# Patient Record
Sex: MALE | Race: WHITE | Employment: OTHER | URBAN - METROPOLITAN AREA
[De-identification: names, ages, dates, MRNs, and addresses within clinical notes are randomized per-mention and may not be internally consistent; named-entity substitution may affect disease eponyms.]

---

## 2018-11-02 ENCOUNTER — OFFICE VISIT (OUTPATIENT)
Dept: FAMILY MEDICINE CLINIC | Age: 38
End: 2018-11-02
Payer: MEDICAID

## 2018-11-02 VITALS
SYSTOLIC BLOOD PRESSURE: 128 MMHG | HEIGHT: 70 IN | WEIGHT: 141 LBS | TEMPERATURE: 98.5 F | RESPIRATION RATE: 14 BRPM | HEART RATE: 90 BPM | DIASTOLIC BLOOD PRESSURE: 80 MMHG | BODY MASS INDEX: 20.19 KG/M2 | OXYGEN SATURATION: 99 %

## 2018-11-02 DIAGNOSIS — F31.9 BIPOLAR DISEASE, CHRONIC (HCC): ICD-10-CM

## 2018-11-02 DIAGNOSIS — F20.9 SCHIZOPHRENIA, CHRONIC CONDITION (HCC): Primary | ICD-10-CM

## 2018-11-02 DIAGNOSIS — Z23 NEEDS FLU SHOT: ICD-10-CM

## 2018-11-02 PROCEDURE — 99203 OFFICE O/P NEW LOW 30 MIN: CPT | Performed by: STUDENT IN AN ORGANIZED HEALTH CARE EDUCATION/TRAINING PROGRAM

## 2018-11-02 PROCEDURE — G8428 CUR MEDS NOT DOCUMENT: HCPCS | Performed by: STUDENT IN AN ORGANIZED HEALTH CARE EDUCATION/TRAINING PROGRAM

## 2018-11-02 PROCEDURE — G8482 FLU IMMUNIZE ORDER/ADMIN: HCPCS | Performed by: STUDENT IN AN ORGANIZED HEALTH CARE EDUCATION/TRAINING PROGRAM

## 2018-11-02 PROCEDURE — 4004F PT TOBACCO SCREEN RCVD TLK: CPT | Performed by: STUDENT IN AN ORGANIZED HEALTH CARE EDUCATION/TRAINING PROGRAM

## 2018-11-02 PROCEDURE — G8420 CALC BMI NORM PARAMETERS: HCPCS | Performed by: STUDENT IN AN ORGANIZED HEALTH CARE EDUCATION/TRAINING PROGRAM

## 2018-11-02 PROCEDURE — 6360000002 HC RX W HCPCS

## 2018-11-02 PROCEDURE — 90686 IIV4 VACC NO PRSV 0.5 ML IM: CPT

## 2018-11-02 PROCEDURE — 99212 OFFICE O/P EST SF 10 MIN: CPT | Performed by: STUDENT IN AN ORGANIZED HEALTH CARE EDUCATION/TRAINING PROGRAM

## 2018-11-02 PROCEDURE — G0008 ADMIN INFLUENZA VIRUS VAC: HCPCS

## 2018-11-02 RX ORDER — TRAZODONE HYDROCHLORIDE 150 MG/1
1 TABLET ORAL NIGHTLY
Refills: 2 | COMMUNITY
Start: 2018-10-02

## 2018-11-02 RX ORDER — RISPERIDONE 2 MG/1
1 TABLET, FILM COATED ORAL EVERY MORNING
Refills: 2 | COMMUNITY
Start: 2018-10-02

## 2018-11-02 RX ORDER — CYPROHEPTADINE HYDROCHLORIDE 4 MG/1
2 TABLET ORAL NIGHTLY
Refills: 2 | COMMUNITY
Start: 2018-10-02

## 2018-11-02 RX ORDER — RISPERIDONE 4 MG/1
1 TABLET, FILM COATED ORAL NIGHTLY
Refills: 2 | COMMUNITY
Start: 2018-10-02

## 2018-11-02 ASSESSMENT — PATIENT HEALTH QUESTIONNAIRE - PHQ9
SUM OF ALL RESPONSES TO PHQ QUESTIONS 1-9: 0
SUM OF ALL RESPONSES TO PHQ9 QUESTIONS 1 & 2: 0
1. LITTLE INTEREST OR PLEASURE IN DOING THINGS: 0
2. FEELING DOWN, DEPRESSED OR HOPELESS: 0
SUM OF ALL RESPONSES TO PHQ QUESTIONS 1-9: 0

## 2018-11-02 ASSESSMENT — ENCOUNTER SYMPTOMS
CHEST TIGHTNESS: 0
NAUSEA: 0
SHORTNESS OF BREATH: 0
WHEEZING: 0
DIARRHEA: 0
PHOTOPHOBIA: 0
VOMITING: 0
SORE THROAT: 0
BLOOD IN STOOL: 0
CONSTIPATION: 0
ABDOMINAL PAIN: 0

## 2018-11-02 NOTE — PROGRESS NOTES
M 40   New pt   Comes for checkup   No complaints      Bipolar 1 and schizophrenia   Sees Psychiatry   Controlled   No SI or HI     Some arthritis from old fxs     Occasional headaches   ROS WNL    Blood pressure 128/80, pulse 90, temperature 98.5 °F (36.9 °C), temperature source Oral, resp. rate 14, height 5' 10\" (1.778 m), weight 141 lb (64 kg), SpO2 99 %. HEENT WNL     Heart regular    Lungs clear    abd non-tender      No edema    Pulses intact    Flat affect    Continue with Psychiatry    RTO 1 year or as necessary    Attending Physician Statement  I have discussed the case, including pertinent history and exam findings with the resident. I also have seen the patient and performed key portions of the examination. I agree with the documented assessment and plan.

## 2018-12-12 ENCOUNTER — HOSPITAL ENCOUNTER (OUTPATIENT)
Age: 38
Discharge: HOME | End: 2018-12-12
Payer: MEDICAID

## 2018-12-12 LAB
ALBUMIN SERPL-MCNC: 4.2 G/DL (ref 3.5–5.2)
ALP BLD-CCNC: 90 U/L (ref 40–129)
ALT SERPL-CCNC: 20 U/L (ref 0–40)
ANION GAP SERPL CALCULATED.3IONS-SCNC: 14 MMOL/L (ref 7–16)
AST SERPL-CCNC: 23 U/L (ref 0–39)
BASOPHILS ABSOLUTE: 0.05 E9/L (ref 0–0.2)
BASOPHILS RELATIVE PERCENT: 0.6 % (ref 0–2)
BILIRUB SERPL-MCNC: 0.4 MG/DL (ref 0–1.2)
BUN BLDV-MCNC: 19 MG/DL (ref 6–20)
CALCIUM SERPL-MCNC: 9 MG/DL (ref 8.6–10.2)
CHLORIDE BLD-SCNC: 100 MMOL/L (ref 98–107)
CHOLESTEROL, TOTAL: 225 MG/DL (ref 0–199)
CO2: 26 MMOL/L (ref 22–29)
CREAT SERPL-MCNC: 1 MG/DL (ref 0.7–1.2)
EOSINOPHILS ABSOLUTE: 0.08 E9/L (ref 0.05–0.5)
EOSINOPHILS RELATIVE PERCENT: 0.9 % (ref 0–6)
GFR AFRICAN AMERICAN: >60
GFR NON-AFRICAN AMERICAN: >60 ML/MIN/1.73
GLUCOSE BLD-MCNC: 84 MG/DL (ref 74–99)
HCT VFR BLD CALC: 41.5 % (ref 37–54)
HDLC SERPL-MCNC: 62 MG/DL
HEMOGLOBIN: 14 G/DL (ref 12.5–16.5)
IMMATURE GRANULOCYTES #: 0.03 E9/L
IMMATURE GRANULOCYTES %: 0.4 % (ref 0–5)
LDL CHOLESTEROL CALCULATED: 145 MG/DL (ref 0–99)
LYMPHOCYTES ABSOLUTE: 2.57 E9/L (ref 1.5–4)
LYMPHOCYTES RELATIVE PERCENT: 30.1 % (ref 20–42)
MCH RBC QN AUTO: 29.8 PG (ref 26–35)
MCHC RBC AUTO-ENTMCNC: 33.7 % (ref 32–34.5)
MCV RBC AUTO: 88.3 FL (ref 80–99.9)
MONOCYTES ABSOLUTE: 0.72 E9/L (ref 0.1–0.95)
MONOCYTES RELATIVE PERCENT: 8.4 % (ref 2–12)
NEUTROPHILS ABSOLUTE: 5.1 E9/L (ref 1.8–7.3)
NEUTROPHILS RELATIVE PERCENT: 59.6 % (ref 43–80)
PDW BLD-RTO: 12.7 FL (ref 11.5–15)
PLATELET # BLD: 189 E9/L (ref 130–450)
PMV BLD AUTO: 10.2 FL (ref 7–12)
POTASSIUM SERPL-SCNC: 4.1 MMOL/L (ref 3.5–5)
RBC # BLD: 4.7 E12/L (ref 3.8–5.8)
SODIUM BLD-SCNC: 140 MMOL/L (ref 132–146)
TOTAL PROTEIN: 6.8 G/DL (ref 6.4–8.3)
TRIGL SERPL-MCNC: 91 MG/DL (ref 0–149)
VLDLC SERPL CALC-MCNC: 18 MG/DL
WBC # BLD: 8.6 E9/L (ref 4.5–11.5)

## 2018-12-12 PROCEDURE — 80053 COMPREHEN METABOLIC PANEL: CPT

## 2018-12-12 PROCEDURE — 36415 COLL VENOUS BLD VENIPUNCTURE: CPT

## 2018-12-12 PROCEDURE — 85025 COMPLETE CBC W/AUTO DIFF WBC: CPT

## 2018-12-12 PROCEDURE — 80061 LIPID PANEL: CPT

## 2022-04-20 ENCOUNTER — HOSPITAL ENCOUNTER (EMERGENCY)
Age: 42
Discharge: HOME | End: 2022-04-20
Payer: MEDICAID

## 2022-04-20 ENCOUNTER — APPOINTMENT (OUTPATIENT)
Dept: GENERAL RADIOLOGY | Age: 42
End: 2022-04-20
Payer: MEDICAID

## 2022-04-20 VITALS
WEIGHT: 160 LBS | SYSTOLIC BLOOD PRESSURE: 117 MMHG | RESPIRATION RATE: 16 BRPM | TEMPERATURE: 98.3 F | DIASTOLIC BLOOD PRESSURE: 80 MMHG | OXYGEN SATURATION: 99 % | HEIGHT: 70 IN | BODY MASS INDEX: 22.9 KG/M2 | HEART RATE: 72 BPM

## 2022-04-20 DIAGNOSIS — S63.501A SPRAIN OF RIGHT WRIST, INITIAL ENCOUNTER: ICD-10-CM
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
## 2022-04-20 DIAGNOSIS — S93.402A SPRAIN OF LEFT ANKLE, UNSPECIFIED LIGAMENT, INITIAL ENCOUNTER: ICD-10-CM

## 2022-04-20 DIAGNOSIS — S93.602A FOOT SPRAIN, LEFT, INITIAL ENCOUNTER: ICD-10-CM

## 2022-04-20 PROCEDURE — 73610 X-RAY EXAM OF ANKLE: CPT

## 2022-04-20 PROCEDURE — 73110 X-RAY EXAM OF WRIST: CPT

## 2022-04-20 PROCEDURE — 6370000000 HC RX 637 (ALT 250 FOR IP): Performed by: NURSE PRACTITIONER

## 2022-04-20 PROCEDURE — 73630 X-RAY EXAM OF FOOT: CPT

## 2022-04-20 PROCEDURE — 99283 EMERGENCY DEPT VISIT LOW MDM: CPT

## 2022-04-20 RX ORDER — DIVALPROEX SODIUM 250 MG/1
250 TABLET, DELAYED RELEASE ORAL 2 TIMES DAILY
COMMUNITY

## 2022-04-20 RX ORDER — PROPRANOLOL HYDROCHLORIDE 10 MG/1
10 TABLET ORAL 2 TIMES DAILY
COMMUNITY

## 2022-04-20 RX ORDER — IBUPROFEN 600 MG/1
600 TABLET ORAL ONCE
Status: COMPLETED | OUTPATIENT
Start: 2022-04-20 | End: 2022-04-20

## 2022-04-20 RX ADMIN — IBUPROFEN 600 MG: 600 TABLET ORAL at 11:42

## 2022-04-20 ASSESSMENT — PAIN DESCRIPTION - ORIENTATION: ORIENTATION: LEFT;RIGHT

## 2022-04-20 ASSESSMENT — PAIN - FUNCTIONAL ASSESSMENT
PAIN_FUNCTIONAL_ASSESSMENT: 0-10
PAIN_FUNCTIONAL_ASSESSMENT: PREVENTS OR INTERFERES SOME ACTIVE ACTIVITIES AND ADLS
PAIN_FUNCTIONAL_ASSESSMENT: NONE - DENIES PAIN

## 2022-04-20 ASSESSMENT — PAIN DESCRIPTION - DESCRIPTORS: DESCRIPTORS: SHARP;SHOOTING

## 2022-04-20 ASSESSMENT — PAIN SCALES - GENERAL: PAINLEVEL_OUTOF10: 8

## 2022-04-20 ASSESSMENT — PAIN DESCRIPTION - PAIN TYPE: TYPE: ACUTE PAIN

## 2022-04-20 ASSESSMENT — PAIN DESCRIPTION - LOCATION: LOCATION: FOOT;WRIST

## 2022-04-20 NOTE — ED NOTES
Patient using crutches to exit. Taxi voucher given to patient.       CHRISTIANO Garcia, RN  04/20/22 0697

## 2022-04-20 NOTE — ED PROVIDER NOTES
[facility]  Department of Emergency Medicine   ED  Encounter Note  Admit Date/RoomTime: 2022 11:20 AM  ED Room:   NAME: Eric Dominguez  : 1980  MRN: [id number]     Chief Complaint:  Fall (tripped and fell last night; complaining of left foot/ankle pain and right wrist pain)    HISTORY OF PRESENT ILLNESS        Eric Dominguez is a 37 y.o. male who presents to the ED for evaluation of of a mechanical fall last night. Patient states he tripped and caught his lower left foot and ankle on a cupboard and then tried to catch himself and injured his right wrist on an outstretched hand. He is right-hand dominant and admits to a history of wrist fracture in the past but denies any hardware being in place. He denies head injury, loss of consciousness, neck pain, back pain, chest pain, abdominal pain, numbness, weakness, nausea, vomiting or seizure activity since the injury. He has not taken any over-the-counter intervention for pain that is aggravated by ambulation and movement. He denies any open wound associated with this, is not on anticoagulants and reports his tetanus vaccination to be up-to-date within the last 10 years. His symptoms are moderate in severity and persistent nature. ROS   Pertinent positives and negatives are stated within HPI, all other systems reviewed and are negative. Past Medical History:  has a past medical history of Bipolar disease, chronic ([address] and Chronic schizophrenic ([address] Surgical History:  has a past surgical history that includes Elbow fracture surgery (Right); Arm Surgery (Left); and Mandible surgery (Right). Social History:  reports that he has been smoking cigarettes. He started smoking about 13 years ago. He has a 9.00 pack-year smoking history. He has never used smokeless tobacco. He reports current drug use. Drug: Marijuana [place]). He reports that he does not drink alcohol.     Family History: family history includes Mental Illness in his sister. Allergies: Seroquel [quetiapine fumarate] and Zyprexa [olanzapine]    PHYSICAL EXAM   Oxygen Saturation Interpretation: Normal on room air analysis. ED Triage Vitals [04/20/22 1116]   BP Temp Temp src Pulse Resp SpO2 Height Weight   117/80 98.3 °F (36.8 °C) -- 72 16 99 % 5' 10\" (1.778 m) 160 lb (72.6 kg)       Physical Exam  Constitutional/General: Alert and oriented x3, well appearing, non toxic  HEENT:  NC/NT. No bony tenderness or outward sign of trauma the skull or facial bones. PERRLA. No broken teeth or malocclusion. Airway patent. Neck/back: Supple, full ROM. No midline vertebral tenderness, step-off or crepitus to the cervical, thoracic or lumbar spine. No outward sign of trauma the posterior torso. Respiratory: Lung sounds clear to auscultation bilaterally. No wheezes, rhonchi or stridor. Not in respiratory distress. CV/chest:  Regular rate. Regular rhythm. No murmurs or resting tachycardia. No outward sign of trauma to the anterior torso and no pain upon palpation. GI:  Abdomen soft, non-tender, non-distended. Negative Huynh sign. +BS. No rebound, guarding, or rigidity. No pulsatile masses. Musculoskeletal: Moves all extremities x 4. No pain upon palpation of the pelvis. No pain upon palpation of the left upper and right lower extremity. There is pain upon palpation of the ulnar aspect of the right wrist with mild swelling and no ecchymosis, erythema or open wound. No pain upon palpation of the anatomic snuffbox. There is no other bony tenderness to the right upper extremity. Full range of motion and sensation of the radial, median and ulnar nerve distribution. Strong right radial and ulnar pulses.   There is bony tenderness to the left lateral malleolus, talus and proximal fifth metatarsal without any other bony tenderness to the left lower extremity including no proximal tib-fib tenderness, pain to the Achilles tendon or palpable defect, medial malleolus or other aspects of the left foot. There is strong posterior tibial and pedal pulses. Pain with flexion and extension at the ankle. No open wound but mild swelling and faint ecchymosis at the lateral malleolus. Neurovascularly intact. Warm and well perfused. Capillary refill <3 seconds  Integument: Skin warm and dry. No rashes. Neurologic: Alert and oriented with no focal deficits, symmetric strength 5/5 in the upper and lower extremities bilaterally. Psychiatric: Normal affect. Good eye contact and clear rational thought. Fair grooming and hygiene. Calm and cooperative. Lab / Imaging Results   (All laboratory and radiology results have been personally reviewed by myself)  Labs:  No results found for this visit on 04/20/22. Imaging: All Radiology results interpreted by Radiologist unless otherwise noted. XR FOOT LEFT (MIN 3 VIEWS)   Final Result   No acute osseous abnormality. Mild arthritic changes, 1st metatarsophalangeal joint. XR ANKLE LEFT (MIN 3 VIEWS)   Final Result   No acute bony abnormality of the ankle. XR WRIST RIGHT (MIN 3 VIEWS)   Final Result   Normal wrist radiographs             ED Course / Medical Decision Making     Medications   ibuprofen (ADVIL;MOTRIN) tablet 600 mg (600 mg Oral Given 4/20/22 1142)        Re-examination:  4/20/22       Time: 1245  Patients condition is improving after treatment. Discussed results and outpatient management plan. Patient reports having ibuprofen at home and declines a prescription for    Consult(s):   None    Procedure(s):   none    MDM:   Neurovascularly intact and no focal neurologic deficit. X-rays as interpreted by radiologist negative for fracture. Plan is for Velcro wrist splint, Ace wrap, Aircast and postop shoe with ice, elevation and over-the-counter ibuprofen as needed. He was given follow-up with orthopedics for persistent symptoms after conservative management for 7 to 10 days.   He is aware signs and symptoms indicative of reevaluation in the emergency department setting. Patient verbalizes understanding of instructions and departed in stable condition. Plan of Care/Counseling:  INGRID Crowe CNP reviewed today's visit with the patient in addition to providing specific details for the plan of care and counseling regarding the diagnosis and prognosis. Questions are answered at this time and are agreeable with the plan. ASSESSMENT     1. Fall, initial encounter    2. Sprain of right wrist, initial encounter    3. Sprain of left ankle, unspecified ligament, initial encounter    4. Foot sprain, left, initial encounter      PLAN   Discharged home. Patient condition is stable    New Medications     New Prescriptions    No medications on file     Electronically signed by INGRID Crowe CNP   DD: 4/20/22  **This report was transcribed using voice recognition software. Every effort was made to ensure accuracy; however, inadvertent computerized transcription errors may be present.   END OF ED PROVIDER NOTE       INGRID Crowe CNP  04/20/22 0107

## 2022-05-25 ENCOUNTER — HOSPITAL ENCOUNTER (OUTPATIENT)
Age: 42
Discharge: HOME | End: 2022-05-25
Payer: MEDICAID

## 2022-05-25 LAB
ALBUMIN SERPL-MCNC: 4.8 G/DL (ref 3.5–5.2)
ALP BLD-CCNC: 58 U/L (ref 40–129)
ALT SERPL-CCNC: 7 U/L (ref 0–40)
ANION GAP SERPL CALCULATED.3IONS-SCNC: 15 MMOL/L (ref 7–16)
AST SERPL-CCNC: 15 U/L (ref 0–39)
BASOPHILS ABSOLUTE: 0.05 E9/L (ref 0–0.2)
BASOPHILS RELATIVE PERCENT: 0.8 % (ref 0–2)
BILIRUB SERPL-MCNC: 0.4 MG/DL (ref 0–1.2)
BUN BLDV-MCNC: 11 MG/DL (ref 6–20)
CALCIUM SERPL-MCNC: 9.9 MG/DL (ref 8.6–10.2)
CHLORIDE BLD-SCNC: 98 MMOL/L (ref 98–107)
CO2: 24 MMOL/L (ref 22–29)
CREAT SERPL-MCNC: 0.8 MG/DL (ref 0.7–1.2)
EOSINOPHILS ABSOLUTE: 0.03 E9/L (ref 0.05–0.5)
EOSINOPHILS RELATIVE PERCENT: 0.5 % (ref 0–6)
GFR AFRICAN AMERICAN: >60
GFR NON-AFRICAN AMERICAN: >60 ML/MIN/1.73
GLUCOSE BLD-MCNC: 107 MG/DL (ref 74–99)
HCT VFR BLD CALC: 45.3 % (ref 37–54)
HEMOGLOBIN: 16.1 G/DL (ref 12.5–16.5)
IMMATURE GRANULOCYTES #: 0.01 E9/L
IMMATURE GRANULOCYTES %: 0.2 % (ref 0–5)
LYMPHOCYTES ABSOLUTE: 2.11 E9/L (ref 1.5–4)
LYMPHOCYTES RELATIVE PERCENT: 32.5 % (ref 20–42)
MCH RBC QN AUTO: 30.1 PG (ref 26–35)
MCHC RBC AUTO-ENTMCNC: 35.5 % (ref 32–34.5)
MCV RBC AUTO: 84.8 FL (ref 80–99.9)
MONOCYTES ABSOLUTE: 0.65 E9/L (ref 0.1–0.95)
MONOCYTES RELATIVE PERCENT: 10 % (ref 2–12)
NEUTROPHILS ABSOLUTE: 3.64 E9/L (ref 1.8–7.3)
NEUTROPHILS RELATIVE PERCENT: 56 % (ref 43–80)
PDW BLD-RTO: 12.6 FL (ref 11.5–15)
PLATELET # BLD: 180 E9/L (ref 130–450)
PMV BLD AUTO: 10.2 FL (ref 7–12)
POTASSIUM SERPL-SCNC: 3.7 MMOL/L (ref 3.5–5)
RBC # BLD: 5.34 E12/L (ref 3.8–5.8)
SODIUM BLD-SCNC: 137 MMOL/L (ref 132–146)
T3 TOTAL: 113.9 NG/DL (ref 80–200)
T4 FREE: 1.51 NG/DL (ref 0.93–1.7)
TOTAL PROTEIN: 7.8 G/DL (ref 6.4–8.3)
TSH SERPL DL<=0.05 MIU/L-ACNC: 2.77 UIU/ML (ref 0.27–4.2)
VALPROIC ACID LEVEL: 17 MCG/ML (ref 50–100)
WBC # BLD: 6.5 E9/L (ref 4.5–11.5)

## 2022-05-25 PROCEDURE — 80053 COMPREHEN METABOLIC PANEL: CPT

## 2022-05-25 PROCEDURE — 84480 ASSAY TRIIODOTHYRONINE (T3): CPT

## 2022-05-25 PROCEDURE — 80164 ASSAY DIPROPYLACETIC ACD TOT: CPT

## 2022-05-25 PROCEDURE — 85025 COMPLETE CBC W/AUTO DIFF WBC: CPT

## 2022-05-25 PROCEDURE — 84443 ASSAY THYROID STIM HORMONE: CPT

## 2022-05-25 PROCEDURE — 36415 COLL VENOUS BLD VENIPUNCTURE: CPT

## 2022-05-25 PROCEDURE — 84439 ASSAY OF FREE THYROXINE: CPT

## 2023-03-09 ENCOUNTER — HOSPITAL ENCOUNTER (OUTPATIENT)
Age: 43
Discharge: HOME | End: 2023-03-09
Payer: MEDICAID

## 2023-03-09 LAB
ALBUMIN SERPL-MCNC: 4.1 G/DL (ref 3.5–5.2)
ALP BLD-CCNC: 70 U/L (ref 40–129)
ALT SERPL-CCNC: 7 U/L (ref 0–40)
ANION GAP SERPL CALCULATED.3IONS-SCNC: 11 MMOL/L (ref 7–16)
AST SERPL-CCNC: 14 U/L (ref 0–39)
BASOPHILS ABSOLUTE: 0.07 E9/L (ref 0–0.2)
BASOPHILS RELATIVE PERCENT: 1 % (ref 0–2)
BILIRUB SERPL-MCNC: 0.3 MG/DL (ref 0–1.2)
BUN BLDV-MCNC: 10 MG/DL (ref 6–20)
CALCIUM SERPL-MCNC: 9.4 MG/DL (ref 8.6–10.2)
CHLORIDE BLD-SCNC: 98 MMOL/L (ref 98–107)
CHOLESTEROL, TOTAL: 240 MG/DL (ref 0–199)
CO2: 30 MMOL/L (ref 22–29)
CREAT SERPL-MCNC: 1 MG/DL (ref 0.7–1.2)
EOSINOPHILS ABSOLUTE: 0.06 E9/L (ref 0.05–0.5)
EOSINOPHILS RELATIVE PERCENT: 0.9 % (ref 0–6)
GFR SERPL CREATININE-BSD FRML MDRD: >60 ML/MIN/1.73
GLUCOSE BLD-MCNC: 89 MG/DL (ref 74–99)
HCT VFR BLD CALC: 43.6 % (ref 37–54)
HDLC SERPL-MCNC: 54 MG/DL
HEMOGLOBIN: 15.1 G/DL (ref 12.5–16.5)
IMMATURE GRANULOCYTES #: 0.02 E9/L
IMMATURE GRANULOCYTES %: 0.3 % (ref 0–5)
LDL CHOLESTEROL CALCULATED: 154 MG/DL (ref 0–99)
LYMPHOCYTES ABSOLUTE: 2.55 E9/L (ref 1.5–4)
LYMPHOCYTES RELATIVE PERCENT: 38.1 % (ref 20–42)
MCH RBC QN AUTO: 30.3 PG (ref 26–35)
MCHC RBC AUTO-ENTMCNC: 34.6 % (ref 32–34.5)
MCV RBC AUTO: 87.4 FL (ref 80–99.9)
MONOCYTES ABSOLUTE: 0.65 E9/L (ref 0.1–0.95)
MONOCYTES RELATIVE PERCENT: 9.7 % (ref 2–12)
NEUTROPHILS ABSOLUTE: 3.34 E9/L (ref 1.8–7.3)
NEUTROPHILS RELATIVE PERCENT: 50 % (ref 43–80)
PDW BLD-RTO: 13.1 FL (ref 11.5–15)
PLATELET # BLD: 162 E9/L (ref 130–450)
PMV BLD AUTO: 10.2 FL (ref 7–12)
POTASSIUM SERPL-SCNC: 4.3 MMOL/L (ref 3.5–5)
RBC # BLD: 4.99 E12/L (ref 3.8–5.8)
SODIUM BLD-SCNC: 139 MMOL/L (ref 132–146)
T3 TOTAL: 132.4 NG/DL (ref 80–200)
T4 FREE: 0.96 NG/DL (ref 0.93–1.7)
TOTAL PROTEIN: 6.9 G/DL (ref 6.4–8.3)
TRIGL SERPL-MCNC: 158 MG/DL (ref 0–149)
TSH SERPL DL<=0.05 MIU/L-ACNC: 2.92 UIU/ML (ref 0.27–4.2)
VALPROIC ACID LEVEL: 69 MCG/ML (ref 50–100)
VLDLC SERPL CALC-MCNC: 32 MG/DL
WBC # BLD: 6.7 E9/L (ref 4.5–11.5)

## 2023-03-09 PROCEDURE — 84480 ASSAY TRIIODOTHYRONINE (T3): CPT

## 2023-03-09 PROCEDURE — 84443 ASSAY THYROID STIM HORMONE: CPT

## 2023-03-09 PROCEDURE — 80164 ASSAY DIPROPYLACETIC ACD TOT: CPT

## 2023-03-09 PROCEDURE — 80053 COMPREHEN METABOLIC PANEL: CPT

## 2023-03-09 PROCEDURE — 84439 ASSAY OF FREE THYROXINE: CPT

## 2023-03-09 PROCEDURE — 85025 COMPLETE CBC W/AUTO DIFF WBC: CPT

## 2023-03-09 PROCEDURE — 36415 COLL VENOUS BLD VENIPUNCTURE: CPT

## 2023-03-09 PROCEDURE — 80061 LIPID PANEL: CPT

## 2025-05-29 ENCOUNTER — OFFICE VISIT (OUTPATIENT)
Dept: FAMILY MEDICINE CLINIC | Age: 45
End: 2025-05-29
Payer: COMMERCIAL

## 2025-05-29 VITALS
SYSTOLIC BLOOD PRESSURE: 108 MMHG | TEMPERATURE: 99.3 F | BODY MASS INDEX: 17.51 KG/M2 | WEIGHT: 122 LBS | DIASTOLIC BLOOD PRESSURE: 70 MMHG | HEART RATE: 96 BPM

## 2025-05-29 DIAGNOSIS — R53.83 OTHER FATIGUE: ICD-10-CM

## 2025-05-29 DIAGNOSIS — Z11.59 ENCOUNTER FOR HEPATITIS C SCREENING TEST FOR LOW RISK PATIENT: ICD-10-CM

## 2025-05-29 DIAGNOSIS — F20.9 CHRONIC SCHIZOPHRENIC (HCC): Primary | ICD-10-CM

## 2025-05-29 DIAGNOSIS — Z87.891 PERSONAL HISTORY OF NICOTINE DEPENDENCE: ICD-10-CM

## 2025-05-29 DIAGNOSIS — E78.5 DYSLIPIDEMIA: ICD-10-CM

## 2025-05-29 DIAGNOSIS — Z23 NEED FOR VACCINATION: ICD-10-CM

## 2025-05-29 DIAGNOSIS — Z01.84 IMMUNITY TO HEPATITIS B VIRUS DEMONSTRATED BY SEROLOGIC TEST: ICD-10-CM

## 2025-05-29 DIAGNOSIS — F31.9 BIPOLAR DISEASE, CHRONIC (HCC): ICD-10-CM

## 2025-05-29 DIAGNOSIS — Z01.84 IMMUNITY TO VARICELLA DETERMINED BY SEROLOGIC TEST: ICD-10-CM

## 2025-05-29 PROCEDURE — 90677 PCV20 VACCINE IM: CPT | Performed by: FAMILY MEDICINE

## 2025-05-29 PROCEDURE — 99204 OFFICE O/P NEW MOD 45 MIN: CPT | Performed by: FAMILY MEDICINE

## 2025-05-29 PROCEDURE — 90471 IMMUNIZATION ADMIN: CPT | Performed by: FAMILY MEDICINE

## 2025-05-29 SDOH — ECONOMIC STABILITY: FOOD INSECURITY: WITHIN THE PAST 12 MONTHS, THE FOOD YOU BOUGHT JUST DIDN'T LAST AND YOU DIDN'T HAVE MONEY TO GET MORE.: NEVER TRUE

## 2025-05-29 SDOH — ECONOMIC STABILITY: FOOD INSECURITY: WITHIN THE PAST 12 MONTHS, YOU WORRIED THAT YOUR FOOD WOULD RUN OUT BEFORE YOU GOT MONEY TO BUY MORE.: NEVER TRUE

## 2025-05-29 ASSESSMENT — PATIENT HEALTH QUESTIONNAIRE - PHQ9
9. THOUGHTS THAT YOU WOULD BE BETTER OFF DEAD, OR OF HURTING YOURSELF: NOT AT ALL
3. TROUBLE FALLING OR STAYING ASLEEP: NEARLY EVERY DAY
7. TROUBLE CONCENTRATING ON THINGS, SUCH AS READING THE NEWSPAPER OR WATCHING TELEVISION: SEVERAL DAYS
4. FEELING TIRED OR HAVING LITTLE ENERGY: NEARLY EVERY DAY
2. FEELING DOWN, DEPRESSED OR HOPELESS: NOT AT ALL
8. MOVING OR SPEAKING SO SLOWLY THAT OTHER PEOPLE COULD HAVE NOTICED. OR THE OPPOSITE, BEING SO FIGETY OR RESTLESS THAT YOU HAVE BEEN MOVING AROUND A LOT MORE THAN USUAL: NOT AT ALL
SUM OF ALL RESPONSES TO PHQ QUESTIONS 1-9: 8
1. LITTLE INTEREST OR PLEASURE IN DOING THINGS: NOT AT ALL
SUM OF ALL RESPONSES TO PHQ QUESTIONS 1-9: 8
6. FEELING BAD ABOUT YOURSELF - OR THAT YOU ARE A FAILURE OR HAVE LET YOURSELF OR YOUR FAMILY DOWN: SEVERAL DAYS
5. POOR APPETITE OR OVEREATING: NOT AT ALL
10. IF YOU CHECKED OFF ANY PROBLEMS, HOW DIFFICULT HAVE THESE PROBLEMS MADE IT FOR YOU TO DO YOUR WORK, TAKE CARE OF THINGS AT HOME, OR GET ALONG WITH OTHER PEOPLE: NOT DIFFICULT AT ALL

## 2025-07-05 ENCOUNTER — HOSPITAL ENCOUNTER (EMERGENCY)
Age: 45
Discharge: HOME | End: 2025-07-05
Attending: EMERGENCY MEDICINE
Payer: COMMERCIAL

## 2025-07-05 VITALS
OXYGEN SATURATION: 95 % | RESPIRATION RATE: 16 BRPM | DIASTOLIC BLOOD PRESSURE: 86 MMHG | BODY MASS INDEX: 19.26 KG/M2 | SYSTOLIC BLOOD PRESSURE: 125 MMHG | HEIGHT: 69 IN | TEMPERATURE: 97.3 F | HEART RATE: 80 BPM | WEIGHT: 130 LBS

## 2025-07-05 DIAGNOSIS — R21 RASH AND OTHER NONSPECIFIC SKIN ERUPTION: Primary | ICD-10-CM

## 2025-07-05 PROCEDURE — 99283 EMERGENCY DEPT VISIT LOW MDM: CPT

## 2025-07-05 RX ORDER — METHYLPREDNISOLONE 4 MG/1
2 TABLET ORAL DAILY
COMMUNITY

## 2025-07-05 RX ORDER — CETIRIZINE HYDROCHLORIDE 10 MG/1
10 TABLET ORAL DAILY
COMMUNITY

## 2025-07-05 RX ORDER — CLOTRIMAZOLE AND BETAMETHASONE DIPROPIONATE 10; .64 MG/G; MG/G
CREAM TOPICAL
Qty: 45 G | Refills: 0 | Status: SHIPPED | OUTPATIENT
Start: 2025-07-05

## 2025-07-05 ASSESSMENT — PAIN DESCRIPTION - LOCATION: LOCATION: LEG

## 2025-07-05 ASSESSMENT — PAIN DESCRIPTION - DESCRIPTORS: DESCRIPTORS: ACHING;SPASM

## 2025-07-05 ASSESSMENT — PAIN - FUNCTIONAL ASSESSMENT
PAIN_FUNCTIONAL_ASSESSMENT: ACTIVITIES ARE NOT PREVENTED
PAIN_FUNCTIONAL_ASSESSMENT: 0-10

## 2025-07-05 ASSESSMENT — PAIN DESCRIPTION - ORIENTATION: ORIENTATION: RIGHT;LEFT;POSTERIOR;LOWER

## 2025-07-05 ASSESSMENT — PAIN DESCRIPTION - FREQUENCY: FREQUENCY: CONTINUOUS

## 2025-07-05 ASSESSMENT — PAIN DESCRIPTION - PAIN TYPE: TYPE: ACUTE PAIN

## 2025-07-05 ASSESSMENT — PAIN DESCRIPTION - ONSET: ONSET: ON-GOING

## 2025-07-05 ASSESSMENT — PAIN SCALES - GENERAL: PAINLEVEL_OUTOF10: 10

## 2025-07-07 ENCOUNTER — OFFICE VISIT (OUTPATIENT)
Dept: PRIMARY CARE CLINIC | Age: 45
End: 2025-07-07
Payer: COMMERCIAL

## 2025-07-07 ENCOUNTER — HOSPITAL ENCOUNTER (OUTPATIENT)
Age: 45
Discharge: HOME | End: 2025-07-07
Payer: COMMERCIAL

## 2025-07-07 VITALS
BODY MASS INDEX: 17.86 KG/M2 | DIASTOLIC BLOOD PRESSURE: 76 MMHG | SYSTOLIC BLOOD PRESSURE: 125 MMHG | OXYGEN SATURATION: 98 % | TEMPERATURE: 97.8 F | WEIGHT: 120.6 LBS | HEIGHT: 69 IN | RESPIRATION RATE: 20 BRPM | HEART RATE: 73 BPM

## 2025-07-07 DIAGNOSIS — Z11.59 ENCOUNTER FOR HEPATITIS C SCREENING TEST FOR LOW RISK PATIENT: ICD-10-CM

## 2025-07-07 DIAGNOSIS — Z01.84 IMMUNITY TO HEPATITIS B VIRUS DEMONSTRATED BY SEROLOGIC TEST: ICD-10-CM

## 2025-07-07 DIAGNOSIS — R21 RASH AND OTHER NONSPECIFIC SKIN ERUPTION: Primary | ICD-10-CM

## 2025-07-07 DIAGNOSIS — R53.83 OTHER FATIGUE: ICD-10-CM

## 2025-07-07 DIAGNOSIS — Z01.84 IMMUNITY TO VARICELLA DETERMINED BY SEROLOGIC TEST: ICD-10-CM

## 2025-07-07 DIAGNOSIS — E78.5 DYSLIPIDEMIA: ICD-10-CM

## 2025-07-07 LAB
ALBUMIN SERPL-MCNC: 4.5 G/DL (ref 3.5–5.2)
ALP SERPL-CCNC: 67 U/L (ref 40–129)
ALT SERPL-CCNC: 17 U/L (ref 0–50)
ANION GAP SERPL CALCULATED.3IONS-SCNC: 11 MMOL/L (ref 7–16)
AST SERPL-CCNC: 23 U/L (ref 0–50)
BASOPHILS # BLD: 0.06 K/UL (ref 0–0.2)
BASOPHILS NFR BLD: 1 % (ref 0–2)
BILIRUB SERPL-MCNC: 0.4 MG/DL (ref 0–1.2)
BUN SERPL-MCNC: 10 MG/DL (ref 6–20)
CALCIUM SERPL-MCNC: 9.5 MG/DL (ref 8.6–10)
CHLORIDE SERPL-SCNC: 104 MMOL/L (ref 98–107)
CHOLEST SERPL-MCNC: 177 MG/DL
CO2 SERPL-SCNC: 27 MMOL/L (ref 22–29)
CREAT SERPL-MCNC: 0.9 MG/DL (ref 0.7–1.2)
EOSINOPHIL # BLD: 0.04 K/UL (ref 0.05–0.5)
EOSINOPHILS RELATIVE PERCENT: 1 % (ref 0–6)
ERYTHROCYTE [DISTWIDTH] IN BLOOD BY AUTOMATED COUNT: 12.9 % (ref 11.5–15)
GFR, ESTIMATED: >90 ML/MIN/1.73M2
GLUCOSE SERPL-MCNC: 92 MG/DL (ref 74–99)
HBV SURFACE AB SERPL IA-ACNC: <3.5 MIU/ML (ref 0–9.99)
HBV SURFACE AG SERPL QL IA: NONREACTIVE
HCT VFR BLD AUTO: 44.6 % (ref 37–54)
HCV AB SERPL QL IA: NONREACTIVE
HDLC SERPL-MCNC: 64 MG/DL
HGB BLD-MCNC: 15.4 G/DL (ref 12.5–16.5)
IMM GRANULOCYTES # BLD AUTO: <0.03 K/UL (ref 0–0.58)
IMM GRANULOCYTES NFR BLD: 0 % (ref 0–5)
LDLC SERPL CALC-MCNC: 90 MG/DL
LYMPHOCYTES NFR BLD: 2.32 K/UL (ref 1.5–4)
LYMPHOCYTES RELATIVE PERCENT: 30 % (ref 20–42)
MCH RBC QN AUTO: 29.5 PG (ref 26–35)
MCHC RBC AUTO-ENTMCNC: 34.5 G/DL (ref 32–34.5)
MCV RBC AUTO: 85.4 FL (ref 80–99.9)
MONOCYTES NFR BLD: 0.58 K/UL (ref 0.1–0.95)
MONOCYTES NFR BLD: 7 % (ref 2–12)
NEUTROPHILS NFR BLD: 62 % (ref 43–80)
NEUTS SEG NFR BLD: 4.83 K/UL (ref 1.8–7.3)
PLATELET # BLD AUTO: 216 K/UL (ref 130–450)
PMV BLD AUTO: 10.5 FL (ref 7–12)
POTASSIUM SERPL-SCNC: 4 MMOL/L (ref 3.5–5.1)
PROT SERPL-MCNC: 7.2 G/DL (ref 6.4–8.3)
RBC # BLD AUTO: 5.22 M/UL (ref 3.8–5.8)
SODIUM SERPL-SCNC: 142 MMOL/L (ref 136–145)
TRIGL SERPL-MCNC: 117 MG/DL
VLDLC SERPL CALC-MCNC: 23 MG/DL
WBC OTHER # BLD: 7.8 K/UL (ref 4.5–11.5)

## 2025-07-07 PROCEDURE — 86787 VARICELLA-ZOSTER ANTIBODY: CPT

## 2025-07-07 PROCEDURE — 86317 IMMUNOASSAY INFECTIOUS AGENT: CPT

## 2025-07-07 PROCEDURE — 80053 COMPREHEN METABOLIC PANEL: CPT

## 2025-07-07 PROCEDURE — 87340 HEPATITIS B SURFACE AG IA: CPT

## 2025-07-07 PROCEDURE — 85025 COMPLETE CBC W/AUTO DIFF WBC: CPT

## 2025-07-07 PROCEDURE — 80061 LIPID PANEL: CPT

## 2025-07-07 PROCEDURE — 99213 OFFICE O/P EST LOW 20 MIN: CPT

## 2025-07-07 PROCEDURE — 36415 COLL VENOUS BLD VENIPUNCTURE: CPT

## 2025-07-07 PROCEDURE — 86704 HEP B CORE ANTIBODY TOTAL: CPT

## 2025-07-07 PROCEDURE — 86803 HEPATITIS C AB TEST: CPT

## 2025-07-07 NOTE — PROGRESS NOTES
2025     Jp Cam 44 y.o. male   : 1980  Chief Complaint:   Rash (Bilateral LE/)       History of Present Illness:     History of Present Illness  The patient presents for a follow-up of a rash.    On 2025, he experienced a burning sensation on the back of his legs while on a bus. Upon disembarking, he noticed the onset of a severe rash. He sought immediate medical attention at an urgent care facility where he was prescribed steroids. However, the following day, he observed that the rash had worsened and he was experiencing side effects from the steroids. Consequently, he visited the emergency room on 2025, where he was advised to discontinue the steroids and was instead given a combination cream of antifungal and steroid. He is currently using this cream, which he reports has significantly improved the condition of the rash.  There is no rash present at this time.  The rash was initially present on both legs, extending from the ankle to the knee.     He followed up here today because he was told to by the ER.       Past Medical History:     Past Medical History:   Diagnosis Date    Bipolar disease, chronic (HCC)     Chronic schizophrenic (HCC)        Past Surgical History:   Procedure Laterality Date    ARM SURGERY Left     ELBOW FRACTURE SURGERY Right     MANDIBLE SURGERY Right     cyst removal       Family History   Problem Relation Age of Onset    Mental Illness Sister        Social History     Tobacco Use    Smoking status: Every Day     Types: Pipe    Smokeless tobacco: Never   Vaping Use    Vaping status: Never Used   Substance Use Topics    Alcohol use: No    Drug use: Yes     Types: Marijuana (Weed)     Comment: marijuana use in the past (few months ago)       Medications:     Current Outpatient Medications:     cetirizine (ZYRTEC) 10 MG tablet, Take 1 tablet by mouth daily, Disp: , Rfl:     clotrimazole-betamethasone (LOTRISONE) 1-0.05 % cream, Apply topically 2 times

## 2025-07-08 ENCOUNTER — RESULTS FOLLOW-UP (OUTPATIENT)
Dept: FAMILY MEDICINE CLINIC | Age: 45
End: 2025-07-08

## 2025-07-08 DIAGNOSIS — Z23 NEED FOR VACCINATION: Primary | ICD-10-CM

## 2025-07-08 LAB — VZV IGG SER QL IA: NORMAL

## 2025-07-09 LAB — HBV CORE AB SER QL: NONREACTIVE

## 2025-07-18 ENCOUNTER — CLINICAL SUPPORT (OUTPATIENT)
Dept: FAMILY MEDICINE CLINIC | Age: 45
End: 2025-07-18
Payer: COMMERCIAL

## 2025-07-18 DIAGNOSIS — Z23 NEED FOR VACCINATION: Primary | ICD-10-CM

## 2025-07-18 PROCEDURE — 90739 HEPB VACC 2/4 DOSE ADULT IM: CPT | Performed by: NEUROMUSCULOSKELETAL MEDICINE & OMM

## 2025-07-18 PROCEDURE — 90471 IMMUNIZATION ADMIN: CPT | Performed by: NEUROMUSCULOSKELETAL MEDICINE & OMM

## 2025-07-18 NOTE — PROGRESS NOTES
Patient was given Hep B vaccine left deltoid. Tolerated well  Electronically signed by Shelley Baptiste MA on 7/18/25 at 1:34 PM EDT

## 2025-08-28 ENCOUNTER — OFFICE VISIT (OUTPATIENT)
Dept: PRIMARY CARE CLINIC | Age: 45
End: 2025-08-28
Payer: COMMERCIAL

## 2025-08-28 VITALS
SYSTOLIC BLOOD PRESSURE: 118 MMHG | BODY MASS INDEX: 17.63 KG/M2 | DIASTOLIC BLOOD PRESSURE: 80 MMHG | HEART RATE: 84 BPM | TEMPERATURE: 97.5 F | HEIGHT: 69 IN | WEIGHT: 119 LBS | OXYGEN SATURATION: 99 %

## 2025-08-28 DIAGNOSIS — S59.901A ELBOW INJURY, RIGHT, INITIAL ENCOUNTER: Primary | ICD-10-CM

## 2025-08-28 PROCEDURE — 99213 OFFICE O/P EST LOW 20 MIN: CPT | Performed by: NURSE PRACTITIONER

## 2025-08-28 RX ORDER — KETOROLAC TROMETHAMINE 30 MG/ML
60 INJECTION, SOLUTION INTRAMUSCULAR; INTRAVENOUS ONCE
Status: COMPLETED | OUTPATIENT
Start: 2025-08-28 | End: 2025-08-28

## 2025-08-28 RX ORDER — HYDROCODONE BITARTRATE AND ACETAMINOPHEN 5; 325 MG/1; MG/1
1 TABLET ORAL EVERY 8 HOURS PRN
Qty: 9 TABLET | Refills: 0 | Status: SHIPPED | OUTPATIENT
Start: 2025-08-28 | End: 2025-08-31

## 2025-08-28 RX ADMIN — KETOROLAC TROMETHAMINE 60 MG: 30 INJECTION, SOLUTION INTRAMUSCULAR; INTRAVENOUS at 09:14
